# Patient Record
Sex: FEMALE | Race: BLACK OR AFRICAN AMERICAN
[De-identification: names, ages, dates, MRNs, and addresses within clinical notes are randomized per-mention and may not be internally consistent; named-entity substitution may affect disease eponyms.]

---

## 2020-03-17 ENCOUNTER — HOSPITAL ENCOUNTER (EMERGENCY)
Dept: HOSPITAL 72 - EMR | Age: 60
Discharge: HOME | End: 2020-03-17
Payer: SELF-PAY

## 2020-03-17 VITALS — DIASTOLIC BLOOD PRESSURE: 80 MMHG | SYSTOLIC BLOOD PRESSURE: 155 MMHG

## 2020-03-17 VITALS — SYSTOLIC BLOOD PRESSURE: 155 MMHG | DIASTOLIC BLOOD PRESSURE: 80 MMHG

## 2020-03-17 VITALS — BODY MASS INDEX: 28.64 KG/M2 | WEIGHT: 189 LBS | HEIGHT: 68 IN

## 2020-03-17 DIAGNOSIS — I10: ICD-10-CM

## 2020-03-17 DIAGNOSIS — M94.0: Primary | ICD-10-CM

## 2020-03-17 LAB
ADD MANUAL DIFF: NO
ALBUMIN SERPL-MCNC: 4 G/DL (ref 3.4–5)
ALBUMIN/GLOB SERPL: 0.9 {RATIO} (ref 1–2.7)
ALP SERPL-CCNC: 89 U/L (ref 46–116)
ALT SERPL-CCNC: 26 U/L (ref 12–78)
ANION GAP SERPL CALC-SCNC: 10 MMOL/L (ref 5–15)
APPEARANCE UR: CLEAR
APTT PPP: (no result) S
AST SERPL-CCNC: 18 U/L (ref 15–37)
BASOPHILS NFR BLD AUTO: 1.2 % (ref 0–2)
BILIRUB SERPL-MCNC: 0.3 MG/DL (ref 0.2–1)
BUN SERPL-MCNC: 16 MG/DL (ref 7–18)
CALCIUM SERPL-MCNC: 9.5 MG/DL (ref 8.5–10.1)
CHLORIDE SERPL-SCNC: 103 MMOL/L (ref 98–107)
CO2 SERPL-SCNC: 26 MMOL/L (ref 21–32)
CREAT SERPL-MCNC: 1.1 MG/DL (ref 0.55–1.3)
EOSINOPHIL NFR BLD AUTO: 6.1 % (ref 0–3)
ERYTHROCYTE [DISTWIDTH] IN BLOOD BY AUTOMATED COUNT: 11.7 % (ref 11.6–14.8)
GLOBULIN SER-MCNC: 4.5 G/DL
GLUCOSE UR STRIP-MCNC: NEGATIVE MG/DL
HCT VFR BLD CALC: 41 % (ref 37–47)
HGB BLD-MCNC: 13.9 G/DL (ref 12–16)
KETONES UR QL STRIP: NEGATIVE
LEUKOCYTE ESTERASE UR QL STRIP: (no result)
LYMPHOCYTES NFR BLD AUTO: 44.3 % (ref 20–45)
MCV RBC AUTO: 87 FL (ref 80–99)
MONOCYTES NFR BLD AUTO: 8.3 % (ref 1–10)
NEUTROPHILS NFR BLD AUTO: 40 % (ref 45–75)
NITRITE UR QL STRIP: NEGATIVE
PH UR STRIP: 5 [PH] (ref 4.5–8)
PLATELET # BLD: 249 K/UL (ref 150–450)
POTASSIUM SERPL-SCNC: 3.6 MMOL/L (ref 3.5–5.1)
PROT UR QL STRIP: NEGATIVE
RBC # BLD AUTO: 4.71 M/UL (ref 4.2–5.4)
SODIUM SERPL-SCNC: 139 MMOL/L (ref 136–145)
SP GR UR STRIP: 1.01 (ref 1–1.03)
UROBILINOGEN UR-MCNC: NORMAL MG/DL (ref 0–1)
WBC # BLD AUTO: 5.4 K/UL (ref 4.8–10.8)

## 2020-03-17 PROCEDURE — 96374 THER/PROPH/DIAG INJ IV PUSH: CPT

## 2020-03-17 PROCEDURE — 96361 HYDRATE IV INFUSION ADD-ON: CPT

## 2020-03-17 PROCEDURE — 93005 ELECTROCARDIOGRAM TRACING: CPT

## 2020-03-17 PROCEDURE — 81003 URINALYSIS AUTO W/O SCOPE: CPT

## 2020-03-17 PROCEDURE — 84484 ASSAY OF TROPONIN QUANT: CPT

## 2020-03-17 PROCEDURE — 71045 X-RAY EXAM CHEST 1 VIEW: CPT

## 2020-03-17 PROCEDURE — 85025 COMPLETE CBC W/AUTO DIFF WBC: CPT

## 2020-03-17 PROCEDURE — 83735 ASSAY OF MAGNESIUM: CPT

## 2020-03-17 PROCEDURE — 83690 ASSAY OF LIPASE: CPT

## 2020-03-17 PROCEDURE — 99284 EMERGENCY DEPT VISIT MOD MDM: CPT

## 2020-03-17 PROCEDURE — 36415 COLL VENOUS BLD VENIPUNCTURE: CPT

## 2020-03-17 PROCEDURE — 80053 COMPREHEN METABOLIC PANEL: CPT

## 2020-03-17 PROCEDURE — 85379 FIBRIN DEGRADATION QUANT: CPT

## 2020-03-17 NOTE — EMERGENCY ROOM REPORT
History of Present Illness


General


Chief Complaint:  Abdominal Pain


Source:  Patient





Present Illness


HPI


Patient is a 59-year-old female past medical history of hypertension who 

presents to the ER complaining of left lower rib pain and left upper abdominal 

pain for 3 days.  She states that she had a similar episode in the past which 

resolved on its own.  She denies any fever, chills.  She denies any trauma.  

She denies any recent travel.  She denies any other chest pain or shortness of 

breath.  She denies any lower extremity pain or edema.  She denies any nausea 

or vomiting.  She states that she has not taken anything for this pain.


COVID-19 risk:Travel to affect:  No


Allergies:  


Coded Allergies:  


     No Known Allergies (Unverified , 3/17/20)





Patient History


Past Medical History:  HTN


Past Surgical History:  other - Myomectomy


Social History:  Denies: smoking, alcohol use, drug use


Pregnant Now:  No


Reviewed Nursing Documentation:  PMH: Agreed; PSxH: Agreed





Nursing Documentation-PMH


Past Medical History:  No History, Except For


Hx Hypertension:  Yes





Review of Systems


All Other Systems:  negative except mentioned in HPI





Physical Exam





Vital Signs








  Date Time  Temp Pulse Resp B/P (MAP) Pulse Ox O2 Delivery O2 Flow Rate FiO2


 


3/17/20 09:53 98.4 79 16 157/68 (97) 98 Room Air  








Sp02 EP Interpretation:  reviewed, normal


General Appearance:  no apparent distress, alert, GCS 15, non-toxic


Head:  normocephalic, atraumatic


Eyes:  bilateral eye normal inspection, bilateral eye PERRL


ENT:  hearing grossly normal, normal pharynx, no angioedema, normal voice


Neck:  full range of motion, supple/symm/no masses


Respiratory:  lungs clear, normal breath sounds, speaking full sentences, other 

- Tenderness to palpation along ribs 11 and 12 anteriorly with no crepitus and 

no obvious deformity


Cardiovascular #1:  regular rate, rhythm, no edema


Gastrointestinal:  normal bowel sounds, soft, non-distended, no guarding, no 

rebound, other - Left upper quadrant mild tenderness to palpation


Rectal:  deferred


Genitourinary:  normal inspection, no CVA tenderness


Musculoskeletal:  back normal, normal range of motion, calf tenderness, gait/

station normal, non-tender


Neurologic:  alert, motor strength/tone normal, oriented x3, sensory intact, 

responsive, speech normal


Psychiatric:  judgement/insight normal, memory normal, mood/affect normal, no 

suicidal/homicidal ideation


Skin:  no rash


Lymphatic:  no adenopathy





Medical Decision Making


Diagnostic Impression:  


 Primary Impression:  


 Costochondritis


ER Course


Patient presents with reproducible left lower rib pain.  Patient's ER work-up 

demonstrates no significant acute abnormalities.  Specifically troponin was 

normal.  Patient's d-dimer was negative.  Her chest x-ray demonstrates no acute 

cardiopulmonary pathology.  Her pain resolved after Toradol.  After discussing 

risks and benefits of further diagnostics, treatment plans, as well as 

indications for and risks of admission, the patient is agreeable to being 

discharged home.  I have explained that their evaluation and treatment in the 

emergency department today is an important step towards them achieving better 

health but that their evaluation today is not intended to replace further 

evaluation and treatment by a physician in their local clinic.  I have 

explained that while the current findings suggest no immediate life threatening 

emergency they will require further evaluation and treatment by a physician of 

their choice in their area.  They understand that it will be necessary for them 

to review the final reports of their ED visit with their clinic physician.  We 

have reviewed indications for return to the Emergency Department.  I have 

explained that additional time may need to pass and/or additional testing as an 

outpatient may be necessary before a definitive diagnosis can be made.  They 

tell me they are willing to follow up as instructed within the timeframe I 

recommend.  They appear to understand what we discussed.  Additionally they 

understand that if they are unable to be seen by an outpatient physician they 

are welcome, and in fact should, return to the Emergency Department for a 

repeat evaluation.  The patient is stable at time of discharge.


EKG Diagnostic Results


EKG Time:  10:20


EP Interpretation:  MD Nia


Rate:  normal - 65


Rhythm:  NSR


ST Segments:  no acute changes


ASA given to the pt in ED:  No





Last Vital Signs








  Date Time  Temp Pulse Resp B/P (MAP) Pulse Ox O2 Delivery O2 Flow Rate FiO2


 


3/17/20 09:53 98.4 79 16 157/68 (97) 98 Room Air  








Disposition:  HOME, SELF-CARE


Condition:  Stable


Scripts


Ibuprofen* (MOTRIN*) 600 Mg Tablet


600 MG ORAL Q8H PRN for For Pain, #30 TAB 0 Refills


   Prov: Sasha Kramer M.D.         3/17/20


Referrals:  


NOT CHOSEN [facility]/MD,REFERRING (PCP)





Additional Instructions:  


The patient was provided with discharge instructions, notified to follow-up 

with a primary care doctor and or specialist in the next 24-48 hours, and to 

return to the ED if they have worsening of their symptoms. 





Please note that this report is being documented using DRAGON technology.


This can lead to erroneous entry secondary to incorrect interpretation by the 

dictating instrument.











Sasha Kramer M.D. Mar 17, 2020 10:14

## 2020-03-17 NOTE — NUR
ED Nurse Note:



patient walked into ED from home c/o left upper quadrant pain radiating to her 
back and neck for 2 days. denies any n/v/d. patient placed in a private room. 
hospital gown applied.

## 2020-03-17 NOTE — NUR
ER DISCHARGE NOTE:

Patient is cleared to be discharged per ERMD DR PAVON, pt is aox4, on room 
air, with stable vital signs. pt was given dc and prescription instructions, pt 
was able to verbalize understanding, pt id band and iv site removed without 
complications. pt is able to ambulate with steady gait. pt took all belongings.